# Patient Record
Sex: FEMALE | Race: WHITE | NOT HISPANIC OR LATINO | Employment: FULL TIME | URBAN - METROPOLITAN AREA
[De-identification: names, ages, dates, MRNs, and addresses within clinical notes are randomized per-mention and may not be internally consistent; named-entity substitution may affect disease eponyms.]

---

## 2024-03-26 ENCOUNTER — OFFICE VISIT (OUTPATIENT)
Dept: SURGERY | Facility: CLINIC | Age: 29
End: 2024-03-26
Payer: MEDICAID

## 2024-03-26 DIAGNOSIS — O92.20: ICD-10-CM

## 2024-03-26 DIAGNOSIS — L81.9 DISCOLORATION OF SKIN: Primary | ICD-10-CM

## 2024-03-26 PROCEDURE — 99203 OFFICE O/P NEW LOW 30 MIN: CPT | Mod: S$PBB,,, | Performed by: SURGERY

## 2024-03-26 NOTE — PROGRESS NOTES
Breast Surgical Oncology  [place]    Date of Service: 2024    SUBJECTIVE:   Chief complaint: bilateral breast discoloration in pregnancy    HISTORY OF PRESENT ILLNESS:   Eli Geller is a 28 y.o. female who is kindly referred by Dr. Yesica Padilla for bilateral breast discoloration in pregnancy.    The patient is currently 23 weeks pregnant. Over the past month, she has noted increased engorgement, new stretch marks, and pink discoloration to bilateral breasts during pregnancy. She denies breast concerns such as pain, masses, nipple discharge, nipple retraction or lumps under the arm. Bilateral breast sonographic evaluation revealed no abnormality. Her current bra size is 36 DD. She reports galactorrhea with manual expression through prior nipple piercing tracts of both nipples.    Her breast cancer risk factor profile is as follows: Menarche at 15, Menopause at N/A.  She is . Age at first live birth was 28. Family history of cancer is as follows: no breast or ovarian cancer reported    FAMILY HISTORY:     Family History   Problem Relation Age of Onset    Hypertension Paternal Grandfather     Diabetes Paternal Grandfather     Diabetes Paternal Grandmother     Hypertension Father         PAST MEDICAL HISTORY:     Past Medical History:   Diagnosis Date    ADHD     h/o abnormal pap     10/17 per pt, unsure of result, denies colpo. 10/18 nl; 10/19 nl; nl    Inverted nipple     breast u/s nl, [   ]breast specialists.    Low-lying placenta     Supervision of normal pregnancy     CARIE by LMP c/w 8w sono in  and at [facility]. Call yovani for delivery!       SURGICAL HISTORY:   No past surgical history on file.    SOCIAL HISTORY:     Social History     Tobacco Use    Smoking status: Never    Smokeless tobacco: Never   Substance Use Topics    Alcohol use: Not Currently    Drug use: Never        MEDICATIONS/ALLERGIES:     Current Outpatient Medications:     lisdexamfetamine (VYVANSE) 40 MG Cap, Take 40  mg by mouth., Disp: , Rfl:     pantoprazole (PROTONIX) 20 MG tablet, Take 1 tablet (20 mg total) by mouth once daily., Disp: 90 tablet, Rfl: 2  Review of patient's allergies indicates:  No Known Allergies    REVIEW OF SYSTEMS:   I have reviewed 12 systems, including 2 points per system. Pertinent reported positives are: anxiety, back pain    PHYSICAL EXAM:   General: The patient appears well and is in no acute distress.     Marina Tony MA was present as a chaperone for the examination.   BREAST EXAM  Slight Asymmetry R>L  Right:  - Mass: No  - Skin change: pink discoloration, stretch marks   - Nipple Discharge: No  - Nipple retraction: No  - Axillary LAD: No  Left:   - Mass: No  - Skin change: pink discoloration, stretch marks   - Nipple Discharge: No  - Nipple retraction: No  - Axillary LAD: No    IMAGING:   Results for orders placed in visit on 03/15/24    US Breast Bilateral Limited    Narrative  Result:  US Breast Bilateral Limited    History:  Patient is 28 y.o. and is seen for inverted nipple.      Films Compared:  Prior images (if available) were compared.    Findings:  There is no evidence of suspicious masses.    Limited Breast ultrasound was performed. Ultrasound evaluation demonstrates no suspicious abnormality.    Impression  No sonographic evidence of malignancy.    BI-RADS Category 1: Negative Finding    Recommendation:  Annual mammogram is recommended beginning at age 40.    PATHOLOGY:   none    ASSESSMENT:     1. Discoloration of skin    2. Unspecified disorder of breast associated with pregnancy and the puerperium          PLAN:     Eli has a benign examination today. She was reassured that she also has a benign radiographic evaluation. I explained that her current complaints are consistent with normal pregnancy related hormonal changes to the breast. She does not demonstrate any concerning findings suspicious for malignancy. We also discussed nipple piercing tracts and diversion of her  galactorrhea through those tracts is possible and not worrisome.     The patient is in agreement with the plan. Questions were encouraged and answered to patient's satisfaction. Eli will call our office with any questions or concerns.     I spent a total of 30 minutes on this visit. This includes face to face time and non-face to face time preparing to see the patient (eg, review of tests), obtaining and/or reviewing separately obtained history, documenting clinical information in the electronic or other health record, independently interpreting results and communicating results to the patient/family/caregiver, or care coordinator.        Leslee Feldman M.D.